# Patient Record
Sex: MALE | Race: WHITE | NOT HISPANIC OR LATINO | URBAN - METROPOLITAN AREA
[De-identification: names, ages, dates, MRNs, and addresses within clinical notes are randomized per-mention and may not be internally consistent; named-entity substitution may affect disease eponyms.]

---

## 2021-02-15 ENCOUNTER — OFFICE VISIT (OUTPATIENT)
Dept: URBAN - METROPOLITAN AREA CLINIC 124 | Facility: CLINIC | Age: 51
End: 2021-02-15
Payer: COMMERCIAL

## 2021-02-15 DIAGNOSIS — K92.1 HEMATOCHEZIA: ICD-10-CM

## 2021-02-15 DIAGNOSIS — K58.9 IBS (IRRITABLE BOWEL SYNDROME): ICD-10-CM

## 2021-02-15 DIAGNOSIS — Z12.11 COLON CANCER SCREENING: ICD-10-CM

## 2021-02-15 DIAGNOSIS — K60.2 ANAL FISSURE: ICD-10-CM

## 2021-02-15 PROCEDURE — G8482 FLU IMMUNIZE ORDER/ADMIN: HCPCS | Performed by: INTERNAL MEDICINE

## 2021-02-15 PROCEDURE — G8420 CALC BMI NORM PARAMETERS: HCPCS | Performed by: INTERNAL MEDICINE

## 2021-02-15 PROCEDURE — G9903 PT SCRN TBCO ID AS NON USER: HCPCS | Performed by: INTERNAL MEDICINE

## 2021-02-15 PROCEDURE — 99204 OFFICE O/P NEW MOD 45 MIN: CPT | Performed by: INTERNAL MEDICINE

## 2021-02-15 PROCEDURE — G8427 DOCREV CUR MEDS BY ELIG CLIN: HCPCS | Performed by: INTERNAL MEDICINE

## 2021-02-15 PROCEDURE — 3017F COLORECTAL CA SCREEN DOC REV: CPT | Performed by: INTERNAL MEDICINE

## 2021-02-15 RX ORDER — POLYETHYLENE GLYCOL 3350, SODIUM SULFATE, SODIUM CHLORIDE, POTASSIUM CHLORIDE, ASCORBIC ACID, SODIUM ASCORBATE 140-9-5.2G
AS DIRECTED KIT ORAL ONCE
Qty: 1 KIT | Refills: 0 | OUTPATIENT
Start: 2021-02-15

## 2021-02-15 NOTE — HPI-TODAY'S VISIT:
feb 2021:  Colonoscopy in December 2012 was normal, random colon biopsies obtained for microscopic colitis which were unrevealing, repeat advised in 10 years. Chronic IBS symptoms.  thinks had covid symptoms in aug 2020 - but was negative on testing. following that IBS is slightly worse.  saw colorectal surgery for anal fissure - was prescribed dialiazem which he has been using for 2 weeks. sometimes has constipation. using fiber. scant rectal bleeding. symptoms are worse in past 6 months. No family history of colon cancer / GI malignancies / IBD. was asked to use PPI + h2B by allergist for past 3-4 months. no dysphagia. no heartburn.

## 2021-03-16 ENCOUNTER — OFFICE VISIT (OUTPATIENT)
Dept: URBAN - METROPOLITAN AREA CLINIC 105 | Facility: CLINIC | Age: 51
End: 2021-03-16

## 2021-03-17 ENCOUNTER — OFFICE VISIT (OUTPATIENT)
Dept: URBAN - METROPOLITAN AREA SURGERY CENTER 16 | Facility: SURGERY CENTER | Age: 51
End: 2021-03-17
Payer: COMMERCIAL

## 2021-03-17 DIAGNOSIS — Z12.11 COLON CANCER SCREENING: ICD-10-CM

## 2021-03-17 PROCEDURE — 45378 DIAGNOSTIC COLONOSCOPY: CPT | Performed by: INTERNAL MEDICINE

## 2021-03-17 PROCEDURE — G8907 PT DOC NO EVENTS ON DISCHARG: HCPCS | Performed by: INTERNAL MEDICINE

## 2021-03-17 RX ORDER — POLYETHYLENE GLYCOL 3350, SODIUM SULFATE, SODIUM CHLORIDE, POTASSIUM CHLORIDE, ASCORBIC ACID, SODIUM ASCORBATE 140-9-5.2G
AS DIRECTED KIT ORAL ONCE
Qty: 1 KIT | Refills: 0 | Status: ACTIVE | COMMUNITY
Start: 2021-02-15

## 2021-08-18 ENCOUNTER — LAB OUTSIDE AN ENCOUNTER (OUTPATIENT)
Dept: URBAN - METROPOLITAN AREA CLINIC 124 | Facility: CLINIC | Age: 51
End: 2021-08-18

## 2021-08-18 ENCOUNTER — TELEPHONE ENCOUNTER (OUTPATIENT)
Dept: URBAN - METROPOLITAN AREA CLINIC 25 | Facility: CLINIC | Age: 51
End: 2021-08-18

## 2021-08-18 ENCOUNTER — OFFICE VISIT (OUTPATIENT)
Dept: URBAN - METROPOLITAN AREA CLINIC 124 | Facility: CLINIC | Age: 51
End: 2021-08-18
Payer: COMMERCIAL

## 2021-08-18 DIAGNOSIS — R10.12 LEFT UPPER QUADRANT ABDOMINAL PAIN: ICD-10-CM

## 2021-08-18 DIAGNOSIS — R07.0 THROAT DISCOMFORT: ICD-10-CM

## 2021-08-18 DIAGNOSIS — K92.1 HEMATOCHEZIA: ICD-10-CM

## 2021-08-18 DIAGNOSIS — K60.2 ANAL FISSURE: ICD-10-CM

## 2021-08-18 DIAGNOSIS — K58.9 IBS (IRRITABLE BOWEL SYNDROME): ICD-10-CM

## 2021-08-18 PROCEDURE — 99214 OFFICE O/P EST MOD 30 MIN: CPT | Performed by: INTERNAL MEDICINE

## 2021-08-18 RX ORDER — POLYETHYLENE GLYCOL 3350, SODIUM SULFATE, SODIUM CHLORIDE, POTASSIUM CHLORIDE, ASCORBIC ACID, SODIUM ASCORBATE 140-9-5.2G
AS DIRECTED KIT ORAL ONCE
Qty: 1 KIT | Refills: 0 | Status: DISCONTINUED | COMMUNITY
Start: 2021-02-15

## 2021-08-18 NOTE — HPI-TODAY'S VISIT:
August 2021 visit:    Patient underwent a colonoscopy in March 2021 which revealed internal hemorrhoids, hypertrophied anal papillae, no polyps, repeat advised in 10 years for screening. Labs from April 2021 revealed a hemoglobin of 15.8.  Seeing allergy specialist ( dr evans). on meds for sinus allergy. also taking acid reflux meds since reports " throat drainage". mild improvement in symptoms with PPI use.  recently asked to take nexium BID. Stopped pepcid. Post prandial ( 30 mins after meals) throat discomfort. no typical heartburn / regurgitation / dysphagia. Mild LUQ pains - not triggered by po intake. No prior EGD.

## 2021-08-25 ENCOUNTER — OFFICE VISIT (OUTPATIENT)
Dept: URBAN - METROPOLITAN AREA SURGERY CENTER 16 | Facility: SURGERY CENTER | Age: 51
End: 2021-08-25

## 2021-09-29 ENCOUNTER — CLAIMS CREATED FROM THE CLAIM WINDOW (OUTPATIENT)
Dept: URBAN - METROPOLITAN AREA CLINIC 4 | Facility: CLINIC | Age: 51
End: 2021-09-29
Payer: COMMERCIAL

## 2021-09-29 ENCOUNTER — OFFICE VISIT (OUTPATIENT)
Dept: URBAN - METROPOLITAN AREA SURGERY CENTER 16 | Facility: SURGERY CENTER | Age: 51
End: 2021-09-29
Payer: COMMERCIAL

## 2021-09-29 DIAGNOSIS — K21.9 GASTRO-ESOPHAGEAL REFLUX DISEASE WITHOUT ESOPHAGITIS: ICD-10-CM

## 2021-09-29 DIAGNOSIS — K31.7 POLYP OF STOMACH AND DUODENUM: ICD-10-CM

## 2021-09-29 DIAGNOSIS — K21.9 ACID REFLUX: ICD-10-CM

## 2021-09-29 DIAGNOSIS — K31.89 NONSURGICAL DUMPING SYNDROME: ICD-10-CM

## 2021-09-29 DIAGNOSIS — K31.7 BENIGN GASTRIC POLYP: ICD-10-CM

## 2021-09-29 PROCEDURE — 88312 SPECIAL STAINS GROUP 1: CPT | Performed by: PATHOLOGY

## 2021-09-29 PROCEDURE — 88305 TISSUE EXAM BY PATHOLOGIST: CPT | Performed by: PATHOLOGY

## 2021-09-29 PROCEDURE — 43239 EGD BIOPSY SINGLE/MULTIPLE: CPT | Performed by: INTERNAL MEDICINE

## 2021-09-29 PROCEDURE — G8907 PT DOC NO EVENTS ON DISCHARG: HCPCS | Performed by: INTERNAL MEDICINE

## 2021-10-01 ENCOUNTER — TELEPHONE ENCOUNTER (OUTPATIENT)
Dept: URBAN - METROPOLITAN AREA CLINIC 92 | Facility: CLINIC | Age: 51
End: 2021-10-01

## 2021-10-11 ENCOUNTER — WEB ENCOUNTER (OUTPATIENT)
Dept: URBAN - METROPOLITAN AREA CLINIC 25 | Facility: CLINIC | Age: 51
End: 2021-10-11

## 2021-10-11 ENCOUNTER — OFFICE VISIT (OUTPATIENT)
Dept: URBAN - METROPOLITAN AREA CLINIC 25 | Facility: CLINIC | Age: 51
End: 2021-10-11
Payer: COMMERCIAL

## 2021-10-11 ENCOUNTER — LAB OUTSIDE AN ENCOUNTER (OUTPATIENT)
Dept: URBAN - METROPOLITAN AREA CLINIC 25 | Facility: CLINIC | Age: 51
End: 2021-10-11

## 2021-10-11 DIAGNOSIS — K58.9 IBS (IRRITABLE BOWEL SYNDROME): ICD-10-CM

## 2021-10-11 DIAGNOSIS — K92.1 HEMATOCHEZIA: ICD-10-CM

## 2021-10-11 DIAGNOSIS — K60.2 ANAL FISSURE: ICD-10-CM

## 2021-10-11 DIAGNOSIS — R07.0 THROAT DISCOMFORT: ICD-10-CM

## 2021-10-11 DIAGNOSIS — K21.9 NONEROSIVE ESOPHAGEAL REFLUX DISEASE: ICD-10-CM

## 2021-10-11 PROCEDURE — 99214 OFFICE O/P EST MOD 30 MIN: CPT | Performed by: INTERNAL MEDICINE

## 2021-10-11 NOTE — HPI-TODAY'S VISIT:
October 2021 visit:  Patient underwent a EGD in September 2021 which revealed 2 mm gastric polyp, otherwise unrevealing exam, Path confirmed fundic gland polyp, mild reflux esophagitis, no H. pylori infection. On nexium BID ( 2 months)  which doesnt seem to have helped with throat issues, prior to that was taking once a day PPI. no typical heartburn symptoms.

## 2021-10-11 NOTE — HPI-OTHER HISTORIES
feb 2021:  Colonoscopy in December 2012 was normal, random colon biopsies obtained for microscopic colitis which were unrevealing, repeat advised in 10 years. Chronic IBS symptoms.  thinks had covid symptoms in aug 2020 - but was negative on testing. following that IBS is slightly worse.  saw colorectal surgery for anal fissure - was prescribed dialiazem which he has been using for 2 weeks. sometimes has constipation. using fiber. scant rectal bleeding. symptoms are worse in past 6 months. No family history of colon cancer / GI malignancies / IBD. was asked to use PPI + h2B by allergist for past 3-4 months. no dysphagia. no heartburn.  August 2021 visit:    Patient underwent a colonoscopy in March 2021 which revealed internal hemorrhoids, hypertrophied anal papillae, no polyps, repeat advised in 10 years for screening. Labs from April 2021 revealed a hemoglobin of 15.8.  Seeing allergy specialist ( dr evans). on meds for sinus allergy. also taking acid reflux meds since reports " throat drainage". mild improvement in symptoms with PPI use.  recently asked to take nexium BID. Stopped pepcid. Post prandial ( 30 mins after meals) throat discomfort. no typical heartburn / regurgitation / dysphagia. Mild LUQ pains - not triggered by po intake. No prior EGD.

## 2022-10-07 ENCOUNTER — TELEPHONE ENCOUNTER (OUTPATIENT)
Dept: URBAN - METROPOLITAN AREA CLINIC 25 | Facility: CLINIC | Age: 52
End: 2022-10-07

## 2022-10-07 ENCOUNTER — OFFICE VISIT (OUTPATIENT)
Dept: URBAN - METROPOLITAN AREA CLINIC 25 | Facility: CLINIC | Age: 52
End: 2022-10-07
Payer: COMMERCIAL

## 2022-10-07 ENCOUNTER — LAB OUTSIDE AN ENCOUNTER (OUTPATIENT)
Dept: URBAN - METROPOLITAN AREA CLINIC 25 | Facility: CLINIC | Age: 52
End: 2022-10-07

## 2022-10-07 ENCOUNTER — DASHBOARD ENCOUNTERS (OUTPATIENT)
Age: 52
End: 2022-10-07

## 2022-10-07 ENCOUNTER — WEB ENCOUNTER (OUTPATIENT)
Dept: URBAN - METROPOLITAN AREA CLINIC 25 | Facility: CLINIC | Age: 52
End: 2022-10-07

## 2022-10-07 VITALS
HEART RATE: 72 BPM | SYSTOLIC BLOOD PRESSURE: 129 MMHG | WEIGHT: 209 LBS | BODY MASS INDEX: 30.96 KG/M2 | TEMPERATURE: 97.9 F | DIASTOLIC BLOOD PRESSURE: 77 MMHG | HEIGHT: 69 IN

## 2022-10-07 DIAGNOSIS — K21.9 NONEROSIVE ESOPHAGEAL REFLUX DISEASE: ICD-10-CM

## 2022-10-07 DIAGNOSIS — K92.1 HEMATOCHEZIA: ICD-10-CM

## 2022-10-07 DIAGNOSIS — R07.0 THROAT DISCOMFORT: ICD-10-CM

## 2022-10-07 DIAGNOSIS — R12 HEARTBURN: ICD-10-CM

## 2022-10-07 DIAGNOSIS — R09.89 CHRONIC THROAT CLEARING: ICD-10-CM

## 2022-10-07 DIAGNOSIS — K58.9 IBS (IRRITABLE BOWEL SYNDROME): ICD-10-CM

## 2022-10-07 DIAGNOSIS — K60.2 ANAL FISSURE: ICD-10-CM

## 2022-10-07 PROCEDURE — 99214 OFFICE O/P EST MOD 30 MIN: CPT | Performed by: INTERNAL MEDICINE

## 2022-10-07 NOTE — HPI-OTHER HISTORIES
October 2021 visit:  Patient underwent a EGD in September 2021 which revealed 2 mm gastric polyp, otherwise unrevealing exam, Path confirmed fundic gland polyp, mild reflux esophagitis, no H. pylori infection. On nexium BID ( 2 months)  which doesnt seem to have helped with throat issues, prior to that was taking once a day PPI. no typical heartburn symptoms.  feb 2021:  Colonoscopy in December 2012 was normal, random colon biopsies obtained for microscopic colitis which were unrevealing, repeat advised in 10 years. Chronic IBS symptoms.  thinks had covid symptoms in aug 2020 - but was negative on testing. following that IBS is slightly worse.  saw colorectal surgery for anal fissure - was prescribed dialiazem which he has been using for 2 weeks. sometimes has constipation. using fiber. scant rectal bleeding. symptoms are worse in past 6 months. No family history of colon cancer / GI malignancies / IBD. was asked to use PPI + h2B by allergist for past 3-4 months. no dysphagia. no heartburn.  August 2021 visit:    Patient underwent a colonoscopy in March 2021 which revealed internal hemorrhoids, hypertrophied anal papillae, no polyps, repeat advised in 10 years for screening. Labs from April 2021 revealed a hemoglobin of 15.8.  Seeing allergy specialist ( dr evans). on meds for sinus allergy. also taking acid reflux meds since reports " throat drainage". mild improvement in symptoms with PPI use.  recently asked to take nexium BID. Stopped pepcid. Post prandial ( 30 mins after meals) throat discomfort. no typical heartburn / regurgitation / dysphagia. Mild LUQ pains - not triggered by po intake. No prior EGD.

## 2022-10-07 NOTE — HPI-TODAY'S VISIT:
October 2022 visit: tried dupixent for nasal polyposis / chronic rhinosinusitis. stopped since no relief. constant throat clearing. nexium didnt help. changed to protonix 1 week per ENT recommendation. no dysphagia. nonsmoker.

## 2022-11-16 ENCOUNTER — OFFICE VISIT (OUTPATIENT)
Dept: URBAN - METROPOLITAN AREA SURGERY CENTER 16 | Facility: SURGERY CENTER | Age: 52
End: 2022-11-16

## 2022-11-16 ENCOUNTER — TELEPHONE ENCOUNTER (OUTPATIENT)
Dept: URBAN - METROPOLITAN AREA CLINIC 25 | Facility: CLINIC | Age: 52
End: 2022-11-16

## 2022-11-18 ENCOUNTER — OFFICE VISIT (OUTPATIENT)
Dept: URBAN - METROPOLITAN AREA CLINIC 92 | Facility: CLINIC | Age: 52
End: 2022-11-18

## 2022-11-29 ENCOUNTER — TELEPHONE ENCOUNTER (OUTPATIENT)
Dept: URBAN - METROPOLITAN AREA CLINIC 25 | Facility: CLINIC | Age: 52
End: 2022-11-29

## 2022-11-30 ENCOUNTER — OFFICE VISIT (OUTPATIENT)
Dept: URBAN - METROPOLITAN AREA SURGERY CENTER 16 | Facility: SURGERY CENTER | Age: 52
End: 2022-11-30

## 2022-12-14 ENCOUNTER — WEB ENCOUNTER (OUTPATIENT)
Dept: URBAN - METROPOLITAN AREA CLINIC 25 | Facility: CLINIC | Age: 52
End: 2022-12-14

## 2022-12-14 ENCOUNTER — OFFICE VISIT (OUTPATIENT)
Dept: URBAN - METROPOLITAN AREA SURGERY CENTER 16 | Facility: SURGERY CENTER | Age: 52
End: 2022-12-14

## 2022-12-14 ENCOUNTER — CLAIMS CREATED FROM THE CLAIM WINDOW (OUTPATIENT)
Dept: URBAN - METROPOLITAN AREA SURGERY CENTER 16 | Facility: SURGERY CENTER | Age: 52
End: 2022-12-14
Payer: COMMERCIAL

## 2022-12-14 ENCOUNTER — TELEPHONE ENCOUNTER (OUTPATIENT)
Dept: URBAN - METROPOLITAN AREA CLINIC 25 | Facility: CLINIC | Age: 52
End: 2022-12-14

## 2022-12-14 DIAGNOSIS — K21.9 ACID REFLUX: ICD-10-CM

## 2022-12-14 PROCEDURE — 43235 EGD DIAGNOSTIC BRUSH WASH: CPT | Performed by: INTERNAL MEDICINE

## 2022-12-14 PROCEDURE — G8907 PT DOC NO EVENTS ON DISCHARG: HCPCS | Performed by: INTERNAL MEDICINE

## 2022-12-15 ENCOUNTER — TELEPHONE ENCOUNTER (OUTPATIENT)
Dept: URBAN - METROPOLITAN AREA CLINIC 25 | Facility: CLINIC | Age: 52
End: 2022-12-15

## 2022-12-15 ENCOUNTER — WEB ENCOUNTER (OUTPATIENT)
Dept: URBAN - METROPOLITAN AREA CLINIC 25 | Facility: CLINIC | Age: 52
End: 2022-12-15

## 2022-12-16 ENCOUNTER — OFFICE VISIT (OUTPATIENT)
Dept: URBAN - METROPOLITAN AREA CLINIC 92 | Facility: CLINIC | Age: 52
End: 2022-12-16

## 2022-12-19 ENCOUNTER — TELEPHONE ENCOUNTER (OUTPATIENT)
Dept: URBAN - METROPOLITAN AREA CLINIC 25 | Facility: CLINIC | Age: 52
End: 2022-12-19

## 2022-12-19 RX ORDER — DEXLANSOPRAZOLE 30 MG/1
1 CAPSULE CAPSULE, DELAYED RELEASE ORAL ONCE A DAY
Qty: 90 | Refills: 0 | OUTPATIENT
Start: 2022-12-19

## 2023-01-09 ENCOUNTER — OFFICE VISIT (OUTPATIENT)
Dept: URBAN - METROPOLITAN AREA CLINIC 25 | Facility: CLINIC | Age: 53
End: 2023-01-09

## 2023-01-09 PROBLEM — 235595009: Status: ACTIVE | Noted: 2021-10-11

## 2023-01-09 PROBLEM — 10743008 IRRITABLE BOWEL SYNDROME: Status: ACTIVE | Noted: 2021-02-15

## 2023-01-09 RX ORDER — DEXLANSOPRAZOLE 30 MG/1
1 CAPSULE CAPSULE, DELAYED RELEASE ORAL ONCE A DAY
Qty: 90 | Refills: 0 | Status: ACTIVE | COMMUNITY
Start: 2022-12-19

## 2023-01-09 NOTE — HPI-TODAY'S VISIT:
Jan 2023 visit: We performed a repeat EGD with Bravo placement in December 22.  Test was performed off PPI therapy.  EGD revealed normal-appearing esophagus with no hiatal hernia, 2 mm gastric body polyp-pathology confirmed fundic gland polyp, biopsies from distal esophagus revealed reflux type changes.  Gastric biopsy did not reveal any H. pylori.  Proximal esophagus biopsies were benign.

## 2023-01-09 NOTE — HPI-OTHER HISTORIES
October 2022 visit: tried dupixent for nasal polyposis / chronic rhinosinusitis. stopped since no relief. constant throat clearing. nexium didnt help. changed to protonix 1 week per ENT recommendation. no dysphagia. nonsmoker.  October 2021 visit:  Patient underwent a EGD in September 2021 which revealed 2 mm gastric polyp, otherwise unrevealing exam, Path confirmed fundic gland polyp, mild reflux esophagitis, no H. pylori infection. On nexium BID ( 2 months)  which doesnt seem to have helped with throat issues, prior to that was taking once a day PPI. no typical heartburn symptoms.  feb 2021:  Colonoscopy in December 2012 was normal, random colon biopsies obtained for microscopic colitis which were unrevealing, repeat advised in 10 years. Chronic IBS symptoms.  thinks had covid symptoms in aug 2020 - but was negative on testing. following that IBS is slightly worse.  saw colorectal surgery for anal fissure - was prescribed dialiazem which he has been using for 2 weeks. sometimes has constipation. using fiber. scant rectal bleeding. symptoms are worse in past 6 months. No family history of colon cancer / GI malignancies / IBD. was asked to use PPI + h2B by allergist for past 3-4 months. no dysphagia. no heartburn.  August 2021 visit:    Patient underwent a colonoscopy in March 2021 which revealed internal hemorrhoids, hypertrophied anal papillae, no polyps, repeat advised in 10 years for screening. Labs from April 2021 revealed a hemoglobin of 15.8.  Seeing allergy specialist ( dr evans). on meds for sinus allergy. also taking acid reflux meds since reports " throat drainage". mild improvement in symptoms with PPI use.  recently asked to take nexium BID. Stopped pepcid. Post prandial ( 30 mins after meals) throat discomfort. no typical heartburn / regurgitation / dysphagia. Mild LUQ pains - not triggered by po intake. No prior EGD.

## 2023-01-20 ENCOUNTER — OFFICE VISIT (OUTPATIENT)
Dept: URBAN - METROPOLITAN AREA CLINIC 124 | Facility: CLINIC | Age: 53
End: 2023-01-20

## 2023-01-20 RX ORDER — DEXLANSOPRAZOLE 30 MG/1
1 CAPSULE CAPSULE, DELAYED RELEASE ORAL ONCE A DAY
Qty: 90 | Refills: 0 | Status: ACTIVE | COMMUNITY
Start: 2022-12-19

## 2023-01-20 NOTE — HPI-TODAY'S VISIT:
Jan 2023 visit: We performed a repeat EGD with Bravo placement in December 22.  Test was performed off PPI therapy.  EGD revealed normal-appearing esophagus with no hiatal hernia, 2 mm gastric body polyp-pathology confirmed fundic gland polyp, biopsies from distal esophagus revealed reflux type changes.  Gastric biopsy did not reveal any H. pylori.  Proximal esophagus biopsies were benign.  DeMeester score was 18.7 abnormal, SI for chest pain 100, SAP for chest pain 85.6, number of occurrences 1, symptoms were consistent from acid reflux.  Patient has previously tried Nexium and Protonix with no improvement and therefore we suggested Dexilant 30 mg once a day.

## 2023-03-28 ENCOUNTER — WEB ENCOUNTER (OUTPATIENT)
Dept: URBAN - METROPOLITAN AREA CLINIC 124 | Facility: CLINIC | Age: 53
End: 2023-03-28

## 2023-04-10 ENCOUNTER — WEB ENCOUNTER (OUTPATIENT)
Dept: URBAN - METROPOLITAN AREA CLINIC 25 | Facility: CLINIC | Age: 53
End: 2023-04-10

## 2023-04-10 RX ORDER — OMEPRAZOLE 40 MG/1
1 CAPSULE 30 MINUTES BEFORE MORNING MEAL CAPSULE, DELAYED RELEASE ORAL ONCE A DAY
Qty: 90 | Refills: 1 | OUTPATIENT
Start: 2023-04-10
